# Patient Record
Sex: MALE | Employment: OTHER | URBAN - METROPOLITAN AREA
[De-identification: names, ages, dates, MRNs, and addresses within clinical notes are randomized per-mention and may not be internally consistent; named-entity substitution may affect disease eponyms.]

---

## 2022-08-16 ENCOUNTER — VIRTUAL VISIT (OUTPATIENT)
Dept: FAMILY MEDICINE | Facility: CLINIC | Age: 72
End: 2022-08-16
Payer: MEDICARE

## 2022-08-16 DIAGNOSIS — U07.1 INFECTION DUE TO 2019 NOVEL CORONAVIRUS: Primary | ICD-10-CM

## 2022-08-16 PROCEDURE — 99442 PR PHYSICIAN TELEPHONE EVALUATION 11-20 MIN: CPT | Mod: 95 | Performed by: PHYSICIAN ASSISTANT

## 2022-08-16 NOTE — PATIENT INSTRUCTIONS
"Based on your risks, I have prescribed an antiviral medication that may help to reduce your risk of hospitalization due to COVID-19.  This medication is called \"Paxlovid\" and is being prescribed to:    [facility] [phone]    [address] [place] [id number]    Hours:   Mon-Fri: 8:00a - 5:00p   Sat: 9:00a - 12:00p    Drive Thru available.     Please call the pharmacy before going to verify that they have the medication on hand.  If they do not, they can tell you which pharmacy you may go to.      There are multiple potential drug interactions with Paxlovid. Your other medications may need to be adjusted or held in order to safely take Paxlovid. You can request to talk with the pharmacist about other potential drug interactions. Please have an updated list of medications you are taking.    "

## 2023-06-23 NOTE — PROGRESS NOTES
"Juan is a 72 year old who is being evaluated via a billable telephone visit.      What phone number would you like to be contacted at? [phone]  How would you like to obtain your AVS? Mail a copy    Assessment & Plan     Infection due to 2019 novel coronavirus  Risks/benefits of medication use discussed with patient. Patient reports understanding and accepts trial of medication.  States last kidney function test normal.  records on [facility] no labs noted.    - nirmatrelvir and ritonavir (PAXLOVID) therapy pack; Take 3 tablets by mouth 2 times daily for 5 days (Take 2 Nirmatrelvir tablets and 1 Ritonavir tablet twice daily for 5 days)    Declines monoclonal infusion  No follow-ups on file.    LEXIE Le [facility]    Subjective   Juan is a 72 year old, presenting for the following health issues:  Covid Concern      HPI       COVID-19 Symptom Review  How many days ago did these symptoms start? One day     Are any of the following symptoms significant for you?    New or worsening difficulty breathing? No    Worsening cough? Yes, it's a dry cough.     Fever or chills? Yes, the highest temperature was 101.1    Headache: YES    Sore throat: No    Chest pain: No    Diarrhea: No    Body aches? YES    What treatments has patient tried? Acetaminophen   Does patient live in a nursing home, group home, or shelter? No  Does patient have a way to get food/medications during quarantined? Yes, I have a friend or family member who can help me.            Review of Systems   Constitutional, HEENT, cardiovascular, pulmonary, gi and gu systems are negative, except as otherwise noted.      Objective    Vitals - Patient Reported  Systolic (Patient Reported): 125  Diastolic (Patient Reported): 79  Blood pressure taken with manual cuff (will exclude from quality measure): Yes  Weight (Patient Reported): 73.5 kg (162 lb)  Height (Patient Reported): 165.1 cm (5' 5\")  BMI (Based on Pt " Reported Ht/Wt): 26.96  Temperature (Patient Reported): 101.1  F (38.4  C)  Pulse (Patient Reported): 92      Vitals:  No vitals were obtained today due to virtual visit.    Physical Exam   healthy, alert and no distress  PSYCH: Alert and oriented times 3; coherent speech, normal   rate and volume, able to articulate logical thoughts, able   to abstract reason, no tangential thoughts, no hallucinations   or delusions  His affect is normal and pleasant  RESP: No cough, no audible wheezing, able to talk in full sentences  Remainder of exam unable to be completed due to telephone visits                Phone call duration: 11 minutes    .  ..   negative...